# Patient Record
Sex: FEMALE | URBAN - METROPOLITAN AREA
[De-identification: names, ages, dates, MRNs, and addresses within clinical notes are randomized per-mention and may not be internally consistent; named-entity substitution may affect disease eponyms.]

---

## 2024-07-29 ENCOUNTER — APPOINTMENT (RX ONLY)
Dept: URBAN - METROPOLITAN AREA CLINIC 354 | Facility: CLINIC | Age: 64
Setting detail: DERMATOLOGY
End: 2024-07-29

## 2024-07-29 DIAGNOSIS — L82.0 INFLAMED SEBORRHEIC KERATOSIS: ICD-10-CM | Status: INADEQUATELY CONTROLLED

## 2024-07-29 PROBLEM — D48.5 NEOPLASM OF UNCERTAIN BEHAVIOR OF SKIN: Status: ACTIVE | Noted: 2024-07-29

## 2024-07-29 PROCEDURE — ? COUNSELING

## 2024-07-29 PROCEDURE — ? ADDITIONAL NOTES

## 2024-07-29 PROCEDURE — 11102 TANGNTL BX SKIN SINGLE LES: CPT

## 2024-07-29 PROCEDURE — ? BIOPSY BY SHAVE METHOD

## 2024-07-29 ASSESSMENT — LOCATION SIMPLE DESCRIPTION DERM: LOCATION SIMPLE: CHEST

## 2024-07-29 ASSESSMENT — LOCATION ZONE DERM: LOCATION ZONE: TRUNK

## 2024-07-29 ASSESSMENT — LOCATION DETAILED DESCRIPTION DERM: LOCATION DETAILED: RIGHT LATERAL SUPERIOR CHEST

## 2024-07-29 NOTE — PROCEDURE: ADDITIONAL NOTES
Render Risk Assessment In Note?: no
Detail Level: Simple
Additional Notes: Patient prefers  removal over observation as very painful and rubs on bra

## 2025-02-11 ENCOUNTER — APPOINTMENT (OUTPATIENT)
Dept: URBAN - METROPOLITAN AREA CLINIC 354 | Facility: CLINIC | Age: 65
Setting detail: DERMATOLOGY
End: 2025-02-11

## 2025-02-11 DIAGNOSIS — L65.9 NONSCARRING HAIR LOSS, UNSPECIFIED: ICD-10-CM | Status: STABLE

## 2025-02-11 DIAGNOSIS — L21.8 OTHER SEBORRHEIC DERMATITIS: ICD-10-CM | Status: WELL CONTROLLED

## 2025-02-11 PROCEDURE — ? ADDITIONAL NOTES

## 2025-02-11 PROCEDURE — ? PRESCRIPTION MEDICATION MANAGEMENT

## 2025-02-11 PROCEDURE — 99214 OFFICE O/P EST MOD 30 MIN: CPT

## 2025-02-11 PROCEDURE — ? PRESCRIPTION

## 2025-02-11 PROCEDURE — ? COUNSELING

## 2025-02-11 PROCEDURE — ? DEFER

## 2025-02-11 RX ORDER — CICLOPIROX 10 MG/.96ML
SHAMPOO TOPICAL
Qty: 120 | Refills: 3 | Status: ERX | COMMUNITY
Start: 2025-02-11

## 2025-02-11 RX ORDER — FLUOCINOLONE ACETONIDE 0.11 MG/ML
OIL TOPICAL
Qty: 118.28 | Refills: 3 | Status: ERX | COMMUNITY
Start: 2025-02-11

## 2025-02-11 RX ADMIN — CICLOPIROX: 10 SHAMPOO TOPICAL at 00:00

## 2025-02-11 RX ADMIN — FLUOCINOLONE ACETONIDE: 0.11 OIL TOPICAL at 00:00

## 2025-02-11 ASSESSMENT — LOCATION SIMPLE DESCRIPTION DERM
LOCATION SIMPLE: SCALP
LOCATION SIMPLE: LEFT EYEBROW
LOCATION SIMPLE: RIGHT EYEBROW

## 2025-02-11 ASSESSMENT — LOCATION DETAILED DESCRIPTION DERM
LOCATION DETAILED: LEFT SUPERIOR PARIETAL SCALP
LOCATION DETAILED: LEFT CENTRAL EYEBROW
LOCATION DETAILED: RIGHT CENTRAL EYEBROW

## 2025-02-11 ASSESSMENT — LOCATION ZONE DERM
LOCATION ZONE: FACE
LOCATION ZONE: SCALP

## 2025-02-11 NOTE — PROCEDURE: COUNSELING
Patient Specific Counseling (Will Not Stick From Patient To Patient): Patient not interested in treatment.  Will reduce styling practices which may make condition worse.
Detail Level: Simple
Detail Level: Zone

## 2025-02-11 NOTE — PROCEDURE: PRESCRIPTION MEDICATION MANAGEMENT
Initiate Treatment: ciclopirox 1 % shampoo \\nLather onto scalp for 3-5 minutes then rinse use every other week as shampooing.
Detail Level: Detailed
Discontinue Regimen: Ketoconazole shampoo
Plan: Scaling also noted in nasal folds and eyebrows patient was not interested in treatment
Modify Regimen: Derma-Smoothe/FS Scalp Oil 0.01 % \\n Apply to scalp twice weekly.
Render In Strict Bullet Format?: No

## 2025-02-11 NOTE — PROCEDURE: ADDITIONAL NOTES
Additional Notes: Pt is not bothered by the hair loss at this time.  Will reduce tightness of rox and minimize heat
Detail Level: Detailed
Render Risk Assessment In Note?: yes

## 2025-02-11 NOTE — PROCEDURE: DEFER
Introduction Text (Please End With A Colon): The following procedure was deferred:
Procedure To Be Performed At Next Visit: Treatment
Size Of Lesion In Cm (Optional): 0
Detail Level: Detailed